# Patient Record
(demographics unavailable — no encounter records)

---

## 2024-10-09 NOTE — HISTORY OF PRESENT ILLNESS
[FreeTextEntry1] : Sharda is a 41-year-old female who presents to our office for follow up care of a fungus infection in her skin and nails confirmed by biopsy.  She used her cream with much improvement.

## 2024-10-09 NOTE — PROCEDURE
[FreeTextEntry1] : Plan:  Examination.  (Ib=44910).  We had a lengthy discussion concerning the patient's condition.  D/C Ketoconazole cream.  We discussed precautions to avoid recurrence.  Debridement of the toenails by manual clipper and electric  to patient tolerance. (Cm=13192).  Rx: Lamisil 250 mg QD x 7 days. Patient to return: 1 month.

## 2024-10-09 NOTE — PHYSICAL EXAM
[FreeTextEntry3] : Vascular Exam: DP Pulse (left): 1/4. DP Pulse (right): 1/4. PT Pulse (left): 1/4. PT Pulse (right): 1/4. Capillary Return - L: Instantaneous. Capillary Return - R: Instantaneous. Temperature Grad - L: Hot to Warm. Temperature Grad - R: Hot to Warm. [de-identified] : Orthopedic Exam: No structural deformities present. [FreeTextEntry1] : Neurological Exam: Sharp / Dull - L: WNL. Sharp / Dull - R: WNL. Light Touch/pressure - L: WNL. Light Touch/pressure - R: WNL. Hot/cold - L: WNL. Hot/cold - R: WNL. Vibratory - L: WNL. Vibratory - R: WNL.

## 2025-01-22 NOTE — HISTORY OF PRESENT ILLNESS
[FreeTextEntry1] : This 42 year-old female presents to our office for complaint of itching, flaking skin and painful blisters. Patient states that over the past month she has noticed the skin on both feet becoming flaking and itching again, feeling like her athlete's foot infection was returning. Patient states she has not been using any antifungal cream lately as her skin had been better. She states over the past 2 weeks she started getting blisters on her feet, and denies wearing any new or poorly fitting shoes, she denies any changes from her usual shoe wear. She states the blisters are each very painful and preventing her from sleeping and making it difficult and very painful for her to wear shoes. Patient states the blister area look like there is blood there. Patient states she had started on Lamasil but does not want to continue as she does not like taking medications and also states the toe nails are not bothering her so she does not want to treat the toe nail fungus at this time.

## 2025-01-22 NOTE — ASSESSMENT
[FreeTextEntry1] : Exam. Discussed blood collection incision and drainage, including risks, benefits and alternatives, and patient demonstrated verbal understanding and agreed to incision and drainage. Aseptic preparation of right 1st and 3rd toes and left 1st and 2nd toes with betadine and next incision and drainage was performed of toe hematoma sites x 4, each with serosanguinous drainage expressed, and superficial wound noted. Applied neosporin and sterile dressing to each toe bullae site, x4, with pain relief expressed. Instructed the patient to keep dressing clean, dry and intact for 24 hours, then apply antibiotic cream and a bandaid to the sites daily. Instructed the patient to the apply ketoconazole cream to the feet, reviewed with the patient when ad where to apply the medication. Rx ketoconazole cream,, instructed patient on use of medication. Instructed patient to call our office if any signs or symptoms of infection arise. Instructed the patient to call our office if any problems arise. Patient demonstrated verbal understanding of all instructions. Patient to return to office in 1 week.

## 2025-01-22 NOTE — PHYSICAL EXAM
[General Appearance - Alert] : alert [General Appearance - In No Acute Distress] : in no acute distress [General Appearance - Well Nourished] : well nourished [FreeTextEntry3] : DP Pulse (left): 1/4. DP Pulse (right): 1/4. PT Pulse (left): 1/4. PT Pulse (right): 1/4. Capillary Return - L: Instantaneous. Capillary Return - R: Instantaneous. Temperature Grad - L: Hot to Warm. Temperature Grad - R: Hot to Warm. [de-identified] : No pain on palpation of feet b/l, no pain on ROM of foot and ankle b/l, no structural deformities noted b/l. [FreeTextEntry1] : Protective sensation intact WNL b/l, Sharp / Dull - R: WNL. Light Touch/pressure - L: WNL. Light Touch/pressure - R: WNL. Hot/cold - L: WNL. Hot/cold - R: WNL. Vibratory - L: WNL. Vibratory - R: WNL.

## 2025-02-24 NOTE — PHYSICAL EXAM
[General Appearance - Alert] : alert [General Appearance - In No Acute Distress] : in no acute distress [General Appearance - Well Nourished] : well nourished [FreeTextEntry3] : DP Pulse (left): 1/4. DP Pulse (right): 1/4. PT Pulse (left): 1/4. PT Pulse (right): 1/4. Capillary Return - L: Instantaneous. Capillary Return - R: Instantaneous. Temperature Grad - L: Hot to Warm. Temperature Grad - R: Hot to Warm. [de-identified] : No pain on palpation of feet b/l, no pain on ROM of foot and ankle b/l. NO structural deformities noted b/l. [FreeTextEntry1] : Protective sensation intact WNL b/l, Sharp / Dull - R: WNL. Light Touch/pressure - L: WNL. Light Touch/pressure - R: WNL. Hot/cold - L: WNL. Hot/cold - R: WNL. Vibratory - L: WNL. Vibratory - R: WNL.

## 2025-02-24 NOTE — HISTORY OF PRESENT ILLNESS
[FreeTextEntry1] : 42 year-old female patient returns to office for check of the skin on her feet. She states she has been applying the ketoconazole as instructed and she has had no blisters or openings to her fete. She states she has noticed scaling skin on her heels and that she is not sure if it is still fungus, stating she has not had any itching or burning to her feet for about 2 weeks. Patient states she had started Lamasil a few months ago but does not want to continue as she does not like taking medications and also states the toe nails are not bothering her like they had been in the past.

## 2025-02-24 NOTE — ASSESSMENT
[FreeTextEntry1] : Examination  Instructed the patient to discontinue use of ketoconazole cream to the feet. Reviewed tinea pedis education, recurrence and prevention, instructed the patient to dry her toe nails well and feet, and change damp or wet sock and shoes to dry ones. Discussed tinea unguium and treatment options, patient states she does ont want to treat the toe nails with an antifungal at this time. Discussed xerosis at length. Aseptic debridement of flaking., dry skin to b/l feet with sterile #10 blade, patient tolerated well.  Applied lotion to b/l heel and instructed the patient to apply lotion to the xerotic areas daily, and educated the patient on the importance, and made recommendations of lotion. Instructed patient to call our office if any signs or symptoms of infection or if any problems arise. Patient demonstrated verbal understanding of all instructions. Patient to return to office in 1 month.